# Patient Record
Sex: MALE | Race: WHITE | NOT HISPANIC OR LATINO | Employment: FULL TIME | URBAN - METROPOLITAN AREA
[De-identification: names, ages, dates, MRNs, and addresses within clinical notes are randomized per-mention and may not be internally consistent; named-entity substitution may affect disease eponyms.]

---

## 2021-08-03 ENCOUNTER — OFFICE VISIT (OUTPATIENT)
Dept: FAMILY MEDICINE | Facility: CLINIC | Age: 36
End: 2021-08-03

## 2021-08-03 VITALS
TEMPERATURE: 97.9 F | BODY MASS INDEX: 26.29 KG/M2 | OXYGEN SATURATION: 98 % | DIASTOLIC BLOOD PRESSURE: 70 MMHG | WEIGHT: 187.8 LBS | SYSTOLIC BLOOD PRESSURE: 110 MMHG | HEART RATE: 74 BPM | HEIGHT: 71 IN

## 2021-08-03 DIAGNOSIS — F41.9 ANXIETY: ICD-10-CM

## 2021-08-03 DIAGNOSIS — F32.89 OTHER DEPRESSION: Primary | ICD-10-CM

## 2021-08-03 PROBLEM — Z71.89 ACP (ADVANCE CARE PLANNING): Status: ACTIVE | Noted: 2021-08-03

## 2021-08-03 PROBLEM — Z76.89 HEALTH CARE HOME: Status: ACTIVE | Noted: 2021-08-03

## 2021-08-03 PROCEDURE — 99203 OFFICE O/P NEW LOW 30 MIN: CPT | Performed by: FAMILY MEDICINE

## 2021-08-03 RX ORDER — CITALOPRAM HYDROBROMIDE 20 MG/1
20 TABLET ORAL DAILY
Qty: 90 TABLET | Refills: 0 | Status: SHIPPED | OUTPATIENT
Start: 2021-08-03 | End: 2022-05-02

## 2021-08-03 ASSESSMENT — ANXIETY QUESTIONNAIRES
3. WORRYING TOO MUCH ABOUT DIFFERENT THINGS: NEARLY EVERY DAY
GAD7 TOTAL SCORE: 12
6. BECOMING EASILY ANNOYED OR IRRITABLE: SEVERAL DAYS
1. FEELING NERVOUS, ANXIOUS, OR ON EDGE: NEARLY EVERY DAY
2. NOT BEING ABLE TO STOP OR CONTROL WORRYING: NEARLY EVERY DAY
5. BEING SO RESTLESS THAT IT IS HARD TO SIT STILL: SEVERAL DAYS
7. FEELING AFRAID AS IF SOMETHING AWFUL MIGHT HAPPEN: SEVERAL DAYS

## 2021-08-03 ASSESSMENT — PATIENT HEALTH QUESTIONNAIRE - PHQ9
5. POOR APPETITE OR OVEREATING: NOT AT ALL
SUM OF ALL RESPONSES TO PHQ QUESTIONS 1-9: 7

## 2021-08-03 ASSESSMENT — MIFFLIN-ST. JEOR: SCORE: 1813.99

## 2021-08-03 NOTE — PROGRESS NOTES
Assessment & Plan   Problem List Items Addressed This Visit     None      Visit Diagnoses     Other depression    -  Primary    Relevant Medications    citalopram (CELEXA) 20 MG tablet    Anxiety        Relevant Medications    citalopram (CELEXA) 20 MG tablet         1. Other depression  We discussed risks including fda warning and bipolar risks. Discussed dosing, start 20 mg/day. He has good family support. See me back for a recheck In 2 weeks.  He was also interested in therapy. [facility].    2. Anxiety               FUTURE APPOINTMENTS:       - Follow-up visit in 2 weeks for recheck.    No follow-ups on file.    Ana Maria Daigle MD  [facility]    Subjective     Nursing Notes:   Kourtney Chan CMA  8/3/2021 10:07 AM  Addendum  Chief Complaint   Patient presents with     [facility]     Anxiety     2017 stopped taking medication     Pt wants RX for Celexa    Pre-Visit Screening:  Immunizations:declined TD  Colonoscopy:NA  Mammogram:Na  Asthma Action Test/Plan:Na  PHQ9:today  GAD7:today  Questioned patient about current smoking habits Pt.never  OK to leave a detailed message on voice mail for today's visit yes, phone # [phone]                Danis Lugo is a 36 year old male who presents to clinic today for the following health issues   HPI     Here for medication for depression and anxiety. Father in law suggested celexa. In 2017 was --on something but can't remember. Was suicidal at that time. Stopped it. Was on 150 mg of this. They thought he was bipolar. Neg family history bipolar. No idea why. He said he is not bipolar.   Just has some stress and anxiety.   Father in law then here on the phone. He wants him to try celexa because this has worked well for him and his wife.  He thinks he has been on sertraline. He doesn't want to see a counselor.  otc just using a protein supplement, no other supplements no alcohol,no drug use. Feels he needs to exercise  "more.    Review of Systems   Constitutional, HEENT, cardiovascular, pulmonary, gi and gu systems are negative, except as otherwise noted.      Objective    /70 (BP Location: Left arm, Patient Position: Sitting, Cuff Size: Adult Large)   Pulse 74   Temp 97.9  F (36.6  C) (Oral)   Ht 1.803 m (5' 11\")   Wt 85.2 kg (187 lb 12.8 oz)   SpO2 98%   BMI 26.19 kg/m    Body mass index is 26.19 kg/m .  Physical Exam             "

## 2021-08-03 NOTE — LETTER
[facility]  [address]  [address]  [facility] [address]  [phone]      August 3, 2021      Danis Lugo  [id number] [address]   [place] [address]      EMERGENCY CARE PLAN  Presenting Problem Treatment Plan   Questions or concerns during clinic hours I will call the clinic directly:    [facility]  [address], [address]  [place] [address]  [phone]   Questions or concerns outside clinic hours  I will call the 24 hour line at [phone]   Patient needs to schedule an appointment  I will call the  scheduling line at [phone]   Same day treatment   I will call the clinic first, then  urgent care and/or  express care if needed   Clinic Care Coordinators Jessica Mason RN:  [phone]  [facility] Clinical Support Staff: [phone]    Crisis Services:  Behavioral or Mental Health P ([facility])   [phone]   Emergency treatment--Immediately CALL [phone]

## 2021-08-03 NOTE — PATIENT INSTRUCTIONS
Assessment & Plan   Problem List Items Addressed This Visit     None      Visit Diagnoses     Other depression    -  Primary    Relevant Medications    citalopram (CELEXA) 20 MG tablet    Anxiety        Relevant Medications    citalopram (CELEXA) 20 MG tablet         1. Other depression  We discussed risks including fda warning and bipolar risks. Discussed dosing, start 20 mg/day. He has good family support. See me back for a recheck In 2 weeks.    2. Anxiety               FUTURE APPOINTMENTS:       - Follow-up visit in 2 weeks for recheck.    No follow-ups on file.    Ana Maria Daigle MD  [facility]

## 2021-08-03 NOTE — NURSING NOTE
Chief Complaint   Patient presents with     Establish Care     Anxiety     2017 stopped taking medication     Pt wants RX for Celexa    Pre-Visit Screening:  Immunizations:declined TD  Colonoscopy:NA  Mammogram:Na  Asthma Action Test/Plan:Na  PHQ9:today  GAD7:today  Questioned patient about current smoking habits Pt.never  OK to leave a detailed message on voice mail for today's visit yes, phone # [phone]

## 2021-08-04 ENCOUNTER — TELEPHONE (OUTPATIENT)
Dept: FAMILY MEDICINE | Facility: CLINIC | Age: 36
End: 2021-08-04

## 2021-08-04 ASSESSMENT — ANXIETY QUESTIONNAIRES: GAD7 TOTAL SCORE: 12

## 2021-08-04 NOTE — TELEPHONE ENCOUNTER
Brandon called stating that he has taken citalopram two days in a row and feels more fatigued that usual. He asked if he can take it at night. The answer is yes. I informed him that his body will be adjusting to a new medication and it could take some time before his body adjusts to it. I advised to take in the morning for 1 week then take in the pm for 1 week and decide which he liked better.

## 2021-10-15 ENCOUNTER — OFFICE VISIT (OUTPATIENT)
Dept: FAMILY MEDICINE | Facility: CLINIC | Age: 36
End: 2021-10-15

## 2021-10-15 VITALS
WEIGHT: 186 LBS | BODY MASS INDEX: 26.04 KG/M2 | TEMPERATURE: 97.6 F | HEIGHT: 71 IN | HEART RATE: 80 BPM | OXYGEN SATURATION: 97 % | SYSTOLIC BLOOD PRESSURE: 118 MMHG | DIASTOLIC BLOOD PRESSURE: 74 MMHG

## 2021-10-15 DIAGNOSIS — F32.89 OTHER DEPRESSION: Primary | ICD-10-CM

## 2021-10-15 DIAGNOSIS — F41.9 ANXIETY: ICD-10-CM

## 2021-10-15 PROCEDURE — 99213 OFFICE O/P EST LOW 20 MIN: CPT | Performed by: FAMILY MEDICINE

## 2021-10-15 RX ORDER — CITALOPRAM HYDROBROMIDE 40 MG/1
40 TABLET ORAL DAILY
Qty: 90 TABLET | Refills: 1 | Status: SHIPPED | OUTPATIENT
Start: 2021-10-15 | End: 2022-05-02

## 2021-10-15 ASSESSMENT — PATIENT HEALTH QUESTIONNAIRE - PHQ9
SUM OF ALL RESPONSES TO PHQ QUESTIONS 1-9: 1
5. POOR APPETITE OR OVEREATING: NOT AT ALL

## 2021-10-15 ASSESSMENT — ANXIETY QUESTIONNAIRES
2. NOT BEING ABLE TO STOP OR CONTROL WORRYING: NOT AT ALL
IF YOU CHECKED OFF ANY PROBLEMS ON THIS QUESTIONNAIRE, HOW DIFFICULT HAVE THESE PROBLEMS MADE IT FOR YOU TO DO YOUR WORK, TAKE CARE OF THINGS AT HOME, OR GET ALONG WITH OTHER PEOPLE: SOMEWHAT DIFFICULT
6. BECOMING EASILY ANNOYED OR IRRITABLE: SEVERAL DAYS
1. FEELING NERVOUS, ANXIOUS, OR ON EDGE: NOT AT ALL
3. WORRYING TOO MUCH ABOUT DIFFERENT THINGS: SEVERAL DAYS
7. FEELING AFRAID AS IF SOMETHING AWFUL MIGHT HAPPEN: SEVERAL DAYS
5. BEING SO RESTLESS THAT IT IS HARD TO SIT STILL: NOT AT ALL
GAD7 TOTAL SCORE: 3

## 2021-10-15 ASSESSMENT — MIFFLIN-ST. JEOR: SCORE: 1805.82

## 2021-10-15 NOTE — NURSING NOTE
Chief Complaint   Patient presents with     Recheck Medication     refill medications, would like to discuss upping dosage to 40MG      Pre-visit Screening:  Immunizations:  up to date  Colonoscopy:  NA  Mammogram: NA  Asthma Action Test/Plan:  NA  PHQ9:  NA  GAD7:  NA  Questioned patient about current smoking habits Pt. has never smoked.  Ok to leave detailed message on voice mail for today's visit only Yes, phone # [phone]

## 2021-10-15 NOTE — PROGRESS NOTES
"Assessment & Plan   Problem List Items Addressed This Visit        Behavioral    Other depression - Primary    Relevant Medications    citalopram (CELEXA) 40 MG tablet       Other    Anxiety    Relevant Medications    citalopram (CELEXA) 40 MG tablet         1. Other depression  He is doing well on the medication. Increased dose. FDA warning discussed.  - citalopram (CELEXA) 40 MG tablet; Take 1 tablet (40 mg) by mouth daily  Dispense: 90 tablet; Refill: 1    2. Anxiety    - citalopram (CELEXA) 40 MG tablet; Take 1 tablet (40 mg) by mouth daily  Dispense: 90 tablet; Refill: 1           BMI:   Estimated body mass index is 25.94 kg/m  as calculated from the following:    Height as of this encounter: 1.803 m (5' 11\").    Weight as of this encounter: 84.4 kg (186 lb).         FUTURE APPOINTMENTS:       - Follow-up visit in 3-6 months    No follow-ups on file.    Ana Maria Daigle MD  [facility]    Subjective     Nursing Notes:   Alice Dong CMA  10/15/2021  2:03 PM  Signed  Chief Complaint   Patient presents with     Recheck Medication     refill medications, would like to discuss upping dosage to 40MG      Pre-visit Screening:  Immunizations:  up to date  Colonoscopy:  NA  Mammogram: NA  Asthma Action Test/Plan:  NA  PHQ9:  NA  GAD7:  NA  Questioned patient about current smoking habits Pt. has never smoked.  Ok to leave detailed message on voice mail for today's visit only Yes, phone # [phone]           Danis Lugo is a 36 year old male who presents to clinic today for the following health issues   HPI     Here to followup on his celexa for anxiety/depression. Is doing well on the medications. Would like to increase the dose. Family member is on a higher dose and he feels this  Would help him more. No problems with side effects.          Review of Systems   Constitutional, HEENT, cardiovascular, pulmonary, gi and gu systems are negative, except as otherwise noted.      Objective    BP " "118/74 (BP Location: Right arm, Patient Position: Sitting, Cuff Size: Adult Large)   Pulse 80   Temp 97.6  F (36.4  C) (Temporal)   Ht 1.803 m (5' 11\")   Wt 84.4 kg (186 lb)   SpO2 97%   BMI 25.94 kg/m    Body mass index is 25.94 kg/m .  Physical Exam   GENERAL: healthy, alert and no distress  MS: no gross musculoskeletal defects noted, no edema  NEURO: Normal strength and tone, mentation intact and speech normal  PSYCH: mentation appears normal, affect normal/bright          "

## 2021-10-16 ASSESSMENT — ANXIETY QUESTIONNAIRES: GAD7 TOTAL SCORE: 3

## 2021-12-23 ENCOUNTER — TELEPHONE (OUTPATIENT)
Dept: FAMILY MEDICINE | Facility: CLINIC | Age: 36
End: 2021-12-23

## 2021-12-23 NOTE — TELEPHONE ENCOUNTER
Pt called asking if he can safely take creatinine for workouts while on his citalopram. Spoke with LES. No known interactions. She does not encourage the use of creatinine. Notified pt.

## 2021-12-30 ENCOUNTER — TELEPHONE (OUTPATIENT)
Dept: FAMILY MEDICINE | Facility: CLINIC | Age: 36
End: 2021-12-30

## 2021-12-30 NOTE — TELEPHONE ENCOUNTER
Pt called asking if there was a better alternative to citalopram. Tried to call pt back to advise him to schedule appt. Voicemail box full.

## 2022-02-21 ENCOUNTER — ALLIED HEALTH/NURSE VISIT (OUTPATIENT)
Dept: FAMILY MEDICINE | Facility: CLINIC | Age: 37
End: 2022-02-21

## 2022-02-21 DIAGNOSIS — Z23 NEED FOR VACCINATION: Primary | ICD-10-CM

## 2022-02-21 PROCEDURE — 90715 TDAP VACCINE 7 YRS/> IM: CPT | Performed by: FAMILY MEDICINE

## 2022-02-21 PROCEDURE — 90471 IMMUNIZATION ADMIN: CPT | Performed by: FAMILY MEDICINE

## 2022-04-29 NOTE — PROGRESS NOTES
"Assessment & Plan   Problem List Items Addressed This Visit        Behavioral    Other depression    Relevant Medications    citalopram (CELEXA) 20 MG tablet    buPROPion (WELLBUTRIN XL) 150 MG 24 hr tablet       Other    Anxiety    Relevant Medications    citalopram (CELEXA) 20 MG tablet    buPROPion (WELLBUTRIN XL) 150 MG 24 hr tablet      Other Visit Diagnoses     COVID-19 virus infection    -  Primary         1. Other depression  Decrease dose of citalopram to 20 mg/day for 1-2 weeks, then start wellbutrin. FDA warning discussed.  - citalopram (CELEXA) 20 MG tablet; Take 1 tablet (20 mg) by mouth daily  Dispense: 15 tablet; Refill: 0  - buPROPion (WELLBUTRIN XL) 150 MG 24 hr tablet; Take 1 tablet (150 mg) by mouth every morning  Dispense: 90 tablet; Refill: 1    2. Anxiety    - citalopram (CELEXA) 20 MG tablet; Take 1 tablet (20 mg) by mouth daily  Dispense: 15 tablet; Refill: 0  - buPROPion (WELLBUTRIN XL) 150 MG 24 hr tablet; Take 1 tablet (150 mg) by mouth every morning  Dispense: 90 tablet; Refill: 1    3. COVID-19 virus infection  Note written for his work per information that he was able to give me.           BMI:   Estimated body mass index is 27.95 kg/m  as calculated from the following:    Height as of this encounter: 1.803 m (5' 11\").    Weight as of this encounter: 90.9 kg (200 lb 6.4 oz).         FUTURE APPOINTMENTS:       - Follow-up visit in 6 months, call me in 2-3 weeks to let me know how the medication is working for him.    No follow-ups on file.    Ana Maria Daigle MD  [facility]    Subjective     Nursing Notes:   Alice Dong CMA  5/2/2022 10:00 AM  Signed  Chief Complaint   Patient presents with     Letter for School/Work     Was off work due to COVID, tested POS for COVID in January and missed work for 21 days, needs note for work explaining he had covid      Recheck Medication     Refill citalopram      Pre-visit Screening:  Immunizations:  up to date  Colonoscopy:  " "NA  Mammogram: NA  Asthma Action Test/Plan:  NA  PHQ9:  Done today  GAD7:  DOne today  Questioned patient about current smoking habits Pt. has never smoked.  Ok to leave detailed message on voice mail for today's visit only Yes, phone # [phone]           Danis Lugo is a 36 year old male who presents to clinic today for the following health issues   HPI     Here for followup on his anxiety, he is now on celexa 40 mg/day but still find himself anxious more than he feels he should be. Is overthinking things. Sister is on wellbutrin.    Also had covid in January or February. Needs a doctors note saying that he is clear to go. Missed 21 days of pay. Had it for 5 days and then tested. Went to [email or web address] and got tested in the drive through. Tested twice positive.  Tested positive on January 29th.    No symptoms now. Not immunocompromised. No fevers. Otherwise feeling well.        Review of Systems   Constitutional, HEENT, cardiovascular, pulmonary, gi and gu systems are negative, except as otherwise noted.      Objective    /74 (BP Location: Right arm, Patient Position: Sitting, Cuff Size: Adult Large)   Pulse 84   Temp 97.6  F (36.4  C) (Temporal)   Ht 1.803 m (5' 11\")   Wt 90.9 kg (200 lb 6.4 oz)   SpO2 96%   BMI 27.95 kg/m    Body mass index is 27.95 kg/m .  Physical Exam   GENERAL: healthy, alert and no distress  RESP: lungs clear to auscultation - no rales, rhonchi or wheezes  CV: regular rate and rhythm, normal S1 S2, no S3 or S4, no murmur, click or rub, no peripheral edema and peripheral pulses strong  MS: no gross musculoskeletal defects noted, no edema  NEURO: Normal strength and tone, mentation intact and speech normal  PSYCH: mentation appears normal, affect normal/bright          "

## 2022-04-30 DIAGNOSIS — F32.89 OTHER DEPRESSION: ICD-10-CM

## 2022-04-30 DIAGNOSIS — F41.9 ANXIETY: ICD-10-CM

## 2022-05-02 ENCOUNTER — OFFICE VISIT (OUTPATIENT)
Dept: FAMILY MEDICINE | Facility: CLINIC | Age: 37
End: 2022-05-02

## 2022-05-02 VITALS
OXYGEN SATURATION: 96 % | TEMPERATURE: 97.6 F | BODY MASS INDEX: 28.06 KG/M2 | HEART RATE: 84 BPM | SYSTOLIC BLOOD PRESSURE: 112 MMHG | WEIGHT: 200.4 LBS | HEIGHT: 71 IN | DIASTOLIC BLOOD PRESSURE: 74 MMHG

## 2022-05-02 DIAGNOSIS — F41.9 ANXIETY: ICD-10-CM

## 2022-05-02 DIAGNOSIS — F32.89 OTHER DEPRESSION: ICD-10-CM

## 2022-05-02 DIAGNOSIS — U07.1 COVID-19 VIRUS INFECTION: Primary | ICD-10-CM

## 2022-05-02 PROCEDURE — 99214 OFFICE O/P EST MOD 30 MIN: CPT | Performed by: FAMILY MEDICINE

## 2022-05-02 RX ORDER — CITALOPRAM HYDROBROMIDE 40 MG/1
40 TABLET ORAL DAILY
Qty: 90 TABLET | Refills: 1 | Status: CANCELLED | OUTPATIENT
Start: 2022-05-02

## 2022-05-02 RX ORDER — BUPROPION HYDROCHLORIDE 150 MG/1
150 TABLET ORAL EVERY MORNING
Qty: 90 TABLET | Refills: 1 | Status: SHIPPED | OUTPATIENT
Start: 2022-05-02 | End: 2022-06-01

## 2022-05-02 RX ORDER — CITALOPRAM HYDROBROMIDE 40 MG/1
TABLET ORAL
Qty: 90 TABLET | Refills: 0 | COMMUNITY
Start: 2022-05-02

## 2022-05-02 RX ORDER — CITALOPRAM HYDROBROMIDE 20 MG/1
20 TABLET ORAL DAILY
Qty: 15 TABLET | Refills: 0 | Status: SHIPPED | OUTPATIENT
Start: 2022-05-02 | End: 2022-06-01

## 2022-05-02 ASSESSMENT — PATIENT HEALTH QUESTIONNAIRE - PHQ9
5. POOR APPETITE OR OVEREATING: SEVERAL DAYS
SUM OF ALL RESPONSES TO PHQ QUESTIONS 1-9: 6

## 2022-05-02 ASSESSMENT — ANXIETY QUESTIONNAIRES
7. FEELING AFRAID AS IF SOMETHING AWFUL MIGHT HAPPEN: SEVERAL DAYS
6. BECOMING EASILY ANNOYED OR IRRITABLE: MORE THAN HALF THE DAYS
2. NOT BEING ABLE TO STOP OR CONTROL WORRYING: MORE THAN HALF THE DAYS
1. FEELING NERVOUS, ANXIOUS, OR ON EDGE: SEVERAL DAYS
5. BEING SO RESTLESS THAT IT IS HARD TO SIT STILL: NOT AT ALL
IF YOU CHECKED OFF ANY PROBLEMS ON THIS QUESTIONNAIRE, HOW DIFFICULT HAVE THESE PROBLEMS MADE IT FOR YOU TO DO YOUR WORK, TAKE CARE OF THINGS AT HOME, OR GET ALONG WITH OTHER PEOPLE: VERY DIFFICULT
GAD7 TOTAL SCORE: 9
3. WORRYING TOO MUCH ABOUT DIFFERENT THINGS: MORE THAN HALF THE DAYS

## 2022-05-02 NOTE — LETTER
[facility]  [address], [address]  [place]  [address]    May 2, 2022        Danis Lugo  [address] [address]   [place] [address]              Re: Danis Francisgodwin    I saw Danis in clinic today. He reports that he tested positive for covid on January 29th.    He no longer has symptoms and is feeling well. He reports that he missed 21 days of work.         If you have any further questions or problems, please contact our office at [phone].          Ana Maria Daigle MD

## 2022-05-02 NOTE — TELEPHONE ENCOUNTER
Danis Lugo is requesting a refill of:    Refused Prescriptions:                       Disp   Refills    citalopram (CELEXA) 40 MG tablet [Pharmacy*90 tab*0        Sig: TAKE ONE TABLET BY MOUTH ONE TIME DAILY  Refused By: PILO WEAVER  Reason for Refusal: Patient needs appointment    Pt last seen 10/15/21 advised to return in 6 months. Pt due for OV

## 2022-05-02 NOTE — NURSING NOTE
Chief Complaint   Patient presents with     Letter for School/Work     Was off work due to COVID, tested POS for COVID in January and missed work for 21 days, needs note for work explaining he had covid      Recheck Medication     Refill citalopram      Pre-visit Screening:  Immunizations:  up to date  Colonoscopy:  NA  Mammogram: NA  Asthma Action Test/Plan:  NA  PHQ9:  Done today  GAD7:  DOne today  Questioned patient about current smoking habits Pt. has never smoked.  Ok to leave detailed message on voice mail for today's visit only Yes, phone # [phone]

## 2022-05-03 ASSESSMENT — ANXIETY QUESTIONNAIRES: GAD7 TOTAL SCORE: 9

## 2022-06-01 ENCOUNTER — TELEPHONE (OUTPATIENT)
Dept: FAMILY MEDICINE | Facility: CLINIC | Age: 37
End: 2022-06-01

## 2022-06-01 DIAGNOSIS — F41.9 ANXIETY: ICD-10-CM

## 2022-06-01 DIAGNOSIS — F32.89 OTHER DEPRESSION: ICD-10-CM

## 2022-06-01 RX ORDER — CITALOPRAM HYDROBROMIDE 20 MG/1
20 TABLET ORAL DAILY
Qty: 30 TABLET | Refills: 0 | Status: SHIPPED | OUTPATIENT
Start: 2022-06-01 | End: 2022-07-06

## 2022-06-01 NOTE — TELEPHONE ENCOUNTER
Pt is ok with this plan. Would like shot supply of Citalopram 20MG. He will scheduled f/u to discuss other options.    Danis Lugo is requesting a refill of:    Pending Prescriptions:                       Disp   Refills    citalopram (CELEXA) 20 MG tablet          30 tab*0            Sig: Take 1 tablet (20 mg) by mouth daily

## 2022-06-01 NOTE — TELEPHONE ENCOUNTER
Call him--have him stop the wellbutrin, continue on the lower dose of celexa that we discussed and see how he feels. Then recheck with me, he can do a virtual apt. We can change to a different selective serotonin reuptake inhibitor.

## 2022-06-01 NOTE — TELEPHONE ENCOUNTER
Brandon called to follow up since his appt on 5/2/2022. He started bupropion and was previously taking citalopram.  He states that since starting bupropion he has been feeling dizzy and more irritable.   Next steps? Please advise, thanks.    Danis's # [phone]

## 2022-07-03 DIAGNOSIS — F41.9 ANXIETY: ICD-10-CM

## 2022-07-03 DIAGNOSIS — F32.89 OTHER DEPRESSION: ICD-10-CM

## 2022-07-03 RX ORDER — CITALOPRAM HYDROBROMIDE 20 MG/1
TABLET ORAL
Qty: 30 TABLET | Refills: 0 | COMMUNITY
Start: 2022-07-03

## 2022-07-04 NOTE — TELEPHONE ENCOUNTER
Received incoming refill request for  Pending Prescriptions:                       Disp   Refills    citalopram (CELEXA) 20 MG tablet [Pharmac*30 tab*0            Sig: TAKE 1 TABLET(20 MG) BY MOUTH DAILY    Patient last had a refill of this medication on 6/1/22 and the patient is due back to be seen to discuss other options which he was informed of last month.

## 2022-07-06 ENCOUNTER — TELEPHONE (OUTPATIENT)
Dept: FAMILY MEDICINE | Facility: CLINIC | Age: 37
End: 2022-07-06

## 2022-07-06 DIAGNOSIS — F41.9 ANXIETY: ICD-10-CM

## 2022-07-06 DIAGNOSIS — F32.89 OTHER DEPRESSION: ICD-10-CM

## 2022-07-06 RX ORDER — CITALOPRAM HYDROBROMIDE 20 MG/1
20 TABLET ORAL DAILY
Qty: 14 TABLET | Refills: 0 | COMMUNITY
Start: 2022-07-06 | End: 2022-08-01

## 2022-07-06 NOTE — TELEPHONE ENCOUNTER
Danis C Brittney called the clinic support line with the following:    Is needing a refill of his medication.  Advised that he needs OV for refills. Called in #[phone]    Danis Lugo is requesting a refill of:    Signed Prescriptions:                        Disp   Refills    citalopram (CELEXA) 20 MG tablet           14 tab*0        Sig: Take 1 tablet (20 mg) by mouth daily  Authorizing Provider: KARLA ALFREDO  Ordering User: GINETTE CHAMBERLAIN    NOT FURTHER REFILLS UNTIL SEEN. He will be calling to make an OV

## 2022-07-22 DIAGNOSIS — F32.89 OTHER DEPRESSION: ICD-10-CM

## 2022-07-22 DIAGNOSIS — F41.9 ANXIETY: ICD-10-CM

## 2022-07-22 RX ORDER — CITALOPRAM HYDROBROMIDE 20 MG/1
TABLET ORAL
Qty: 14 TABLET | Refills: 0 | COMMUNITY
Start: 2022-07-22

## 2022-07-22 NOTE — TELEPHONE ENCOUNTER
Danis Lugo is requesting a refill of:    Refused Prescriptions:                       Disp   Refills    citalopram (CELEXA) 20 MG tablet [Pharmacy*14 tab*0        Sig: TAKE 1 TABLET BY MOUTH EVERY DAY- MUST SEE DOCTOR  Refused By: PILO WEAVER  Reason for Refusal: Patient needs appointment    Pt due for OV, extension was already given

## 2022-07-27 ENCOUNTER — TELEPHONE (OUTPATIENT)
Dept: FAMILY MEDICINE | Facility: CLINIC | Age: 37
End: 2022-07-27

## 2022-07-27 DIAGNOSIS — F32.89 OTHER DEPRESSION: ICD-10-CM

## 2022-07-27 DIAGNOSIS — F41.9 ANXIETY: ICD-10-CM

## 2022-07-27 NOTE — TELEPHONE ENCOUNTER
Pt called stating he has questions on his medications. Tried to call pt. Voicemail box full. PT DUE FOR OV

## 2022-08-01 RX ORDER — CITALOPRAM HYDROBROMIDE 20 MG/1
20 TABLET ORAL DAILY
Qty: 7 TABLET | Refills: 0 | COMMUNITY
Start: 2022-08-01 | End: 2022-08-08

## 2022-08-01 NOTE — TELEPHONE ENCOUNTER
Pt called back. Scheduled him for 08/08/22. Called in 7 day supply of citalopram 20MG to [facility].

## 2022-08-05 DIAGNOSIS — F32.89 OTHER DEPRESSION: ICD-10-CM

## 2022-08-05 DIAGNOSIS — F41.9 ANXIETY: ICD-10-CM

## 2022-08-05 RX ORDER — CITALOPRAM HYDROBROMIDE 20 MG/1
TABLET ORAL
Qty: 7 TABLET | Refills: 0 | COMMUNITY
Start: 2022-08-05

## 2022-08-05 NOTE — TELEPHONE ENCOUNTER
Danis Lugo is requesting a refill of:    Refused Prescriptions:                       Disp   Refills    citalopram (CELEXA) 20 MG tablet [Pharmacy*7 tabl*0        Sig: TAKE 1 TABLET BY MOUTH EVERY DAY  Refused By: PILO WEAVER  Reason for Refusal: Patient needs appointment    Pt due for OV

## 2022-08-08 ENCOUNTER — OFFICE VISIT (OUTPATIENT)
Dept: FAMILY MEDICINE | Facility: CLINIC | Age: 37
End: 2022-08-08

## 2022-08-08 VITALS
HEIGHT: 71 IN | HEART RATE: 69 BPM | OXYGEN SATURATION: 97 % | BODY MASS INDEX: 29.54 KG/M2 | WEIGHT: 211 LBS | SYSTOLIC BLOOD PRESSURE: 106 MMHG | DIASTOLIC BLOOD PRESSURE: 74 MMHG | TEMPERATURE: 98.2 F

## 2022-08-08 DIAGNOSIS — F32.89 OTHER DEPRESSION: Primary | ICD-10-CM

## 2022-08-08 PROCEDURE — 99214 OFFICE O/P EST MOD 30 MIN: CPT | Performed by: FAMILY MEDICINE

## 2022-08-08 ASSESSMENT — PATIENT HEALTH QUESTIONNAIRE - PHQ9: SUM OF ALL RESPONSES TO PHQ QUESTIONS 1-9: 20

## 2022-08-08 NOTE — PROGRESS NOTES
"Assessment & Plan   Problem List Items Addressed This Visit        Behavioral    Other depression - Primary    Relevant Medications    sertraline (ZOLOFT) 50 MG tablet    Other Relevant Orders    Other Specialty Referral           1. Other depression  I will change his medications, stop celexa, try sertraline, discussed side effects including FDA warning. See me back for a recheck in 2-3 weeks.  - sertraline (ZOLOFT) 50 MG tablet; Take 1 tablet (50 mg) by mouth daily  Dispense: 90 tablet; Refill: 0  - Other Specialty Referral; Future         BMI:   Estimated body mass index is 29.43 kg/m  as calculated from the following:    Height as of this encounter: 1.803 m (5' 11\").    Weight as of this encounter: 95.7 kg (211 lb).         FUTURE APPOINTMENTS:       - Follow-up visit in 2-3 weeks.    No follow-ups on file.    Ana Maria Daigle MD  [facility]    Subjective     There are no exam notes on file for this visit.     Danis Lugo is a 37 year old male who presents to clinic today for the following health issues  HPI     Here to followup on celexa, depression. This isn't working well for him. This was working well for him initially but now it's not really working well. Was initially on celexa then stopped it and started wellbutrin. This just didn't work.  Wife is on sertraline--this seems to be working well for her.   Went to [facility]. Did therapy.  Was told that he doesn't have bipolar di.    His symptoms include:  \"feel sad all the time, zero patience, negative every day, always worried about what people think, miserable going to work and thinking about work at home, hard to live in the present, too hard on myself\"          Review of Systems   Constitutional, HEENT, cardiovascular, pulmonary, gi and gu systems are negative, except as otherwise noted.      Objective    /74 (BP Location: Right arm, Patient Position: Sitting, Cuff Size: Adult Large)   Pulse 69   Temp 98.2 " " F (36.8  C) (Temporal)   Ht 1.803 m (5' 11\")   Wt 95.7 kg (211 lb)   SpO2 97%   BMI 29.43 kg/m    Body mass index is 29.43 kg/m .  Physical Exam   GENERAL: healthy, alert and no distress  MS: no gross musculoskeletal defects noted, no edema  NEURO: Normal strength and tone, mentation intact and speech normal  PSYCH: mentation appears normal, affect normal/bright          "

## 2022-11-30 DIAGNOSIS — F32.89 OTHER DEPRESSION: ICD-10-CM

## 2022-12-05 DIAGNOSIS — F32.89 OTHER DEPRESSION: ICD-10-CM

## 2022-12-05 NOTE — TELEPHONE ENCOUNTER
Received incoming refill request for  Pending Prescriptions:                       Disp   Refills    sertraline (ZOLOFT) 50 MG tablet [Pharmac*90 tab*0            Sig: TAKE 1 TABLET(50 MG) BY MOUTH DAILY    Patient is due for a follow up visit so this is being denied.

## 2022-12-06 NOTE — TELEPHONE ENCOUNTER
Danis Lugo is requesting a refill of:    Refused Prescriptions:                       Disp   Refills    sertraline (ZOLOFT) 50 MG tablet [Pharmacy*90 tab*0        Sig: TAKE 1 TABLET(50 MG) BY MOUTH DAILY  Refused By: PILO WEAVER  Reason for Refusal: Patient needs appointment    Pt last seen 08/08/22 advised to return in 2-3 weeks, pt due for OV

## 2022-12-12 ENCOUNTER — TELEPHONE (OUTPATIENT)
Dept: FAMILY MEDICINE | Facility: CLINIC | Age: 37
End: 2022-12-12

## 2022-12-12 DIAGNOSIS — F32.89 OTHER DEPRESSION: ICD-10-CM

## 2022-12-12 NOTE — TELEPHONE ENCOUNTER
Pt Call the CS line -requesting an extension on his med sertraline 50 mg - He has an appointment on January 9, 2023.     Thank you. Dr Daigle

## 2023-01-09 ENCOUNTER — OFFICE VISIT (OUTPATIENT)
Dept: FAMILY MEDICINE | Facility: CLINIC | Age: 38
End: 2023-01-09

## 2023-01-09 ENCOUNTER — TELEPHONE (OUTPATIENT)
Dept: FAMILY MEDICINE | Facility: CLINIC | Age: 38
End: 2023-01-09

## 2023-01-09 VITALS
HEIGHT: 71 IN | TEMPERATURE: 98.2 F | BODY MASS INDEX: 30.13 KG/M2 | OXYGEN SATURATION: 97 % | DIASTOLIC BLOOD PRESSURE: 74 MMHG | WEIGHT: 215.2 LBS | HEART RATE: 78 BPM | SYSTOLIC BLOOD PRESSURE: 108 MMHG

## 2023-01-09 DIAGNOSIS — F32.89 OTHER DEPRESSION: ICD-10-CM

## 2023-01-09 DIAGNOSIS — F41.9 ANXIETY: Primary | ICD-10-CM

## 2023-01-09 PROCEDURE — 99214 OFFICE O/P EST MOD 30 MIN: CPT | Performed by: FAMILY MEDICINE

## 2023-01-09 RX ORDER — SERTRALINE HYDROCHLORIDE 100 MG/1
100 TABLET, FILM COATED ORAL DAILY
Qty: 90 TABLET | Refills: 1 | Status: SHIPPED | OUTPATIENT
Start: 2023-01-09 | End: 2023-01-10

## 2023-01-09 ASSESSMENT — ANXIETY QUESTIONNAIRES
6. BECOMING EASILY ANNOYED OR IRRITABLE: MORE THAN HALF THE DAYS
IF YOU CHECKED OFF ANY PROBLEMS ON THIS QUESTIONNAIRE, HOW DIFFICULT HAVE THESE PROBLEMS MADE IT FOR YOU TO DO YOUR WORK, TAKE CARE OF THINGS AT HOME, OR GET ALONG WITH OTHER PEOPLE: VERY DIFFICULT
1. FEELING NERVOUS, ANXIOUS, OR ON EDGE: MORE THAN HALF THE DAYS
2. NOT BEING ABLE TO STOP OR CONTROL WORRYING: NEARLY EVERY DAY
3. WORRYING TOO MUCH ABOUT DIFFERENT THINGS: NEARLY EVERY DAY
GAD7 TOTAL SCORE: 13
7. FEELING AFRAID AS IF SOMETHING AWFUL MIGHT HAPPEN: MORE THAN HALF THE DAYS
5. BEING SO RESTLESS THAT IT IS HARD TO SIT STILL: NOT AT ALL
GAD7 TOTAL SCORE: 13

## 2023-01-09 ASSESSMENT — PATIENT HEALTH QUESTIONNAIRE - PHQ9
5. POOR APPETITE OR OVEREATING: SEVERAL DAYS
SUM OF ALL RESPONSES TO PHQ QUESTIONS 1-9: 9

## 2023-01-09 NOTE — TELEPHONE ENCOUNTER
Patient called into the CS line asking for his medication to be changed from 100 mg to 75 mg as he feels this would be a better transition from taking the 50 mg he was currently on. Routing to Dr. Daigle for approval.

## 2023-01-09 NOTE — NURSING NOTE
Chief Complaint   Patient presents with     Recheck Medication     Recheck on sertraline, feels he would like to go up on the dose      Pre-visit Screening:  Immunizations:  up to date  Colonoscopy:  NA  Mammogram: NA  Asthma Action Test/Plan:  NA  PHQ9:  Done today  GAD7:  Done today  Questioned patient about current smoking habits Pt. has never smoked.  Ok to leave detailed message on voice mail for today's visit only Yes, phone # [phone]

## 2023-01-09 NOTE — PROGRESS NOTES
"Assessment & Plan   Problem List Items Addressed This Visit        Behavioral    Other depression    Relevant Medications    sertraline (ZOLOFT) 100 MG tablet       Other    Anxiety - Primary    Relevant Medications    sertraline (ZOLOFT) 100 MG tablet      1. Anxiety  He is doing better but would like an increased dose of sertraline. Increase to 100 mg/day, side effects including FDA warning discussed.  - sertraline (ZOLOFT) 100 MG tablet; Take 1 tablet (100 mg) by mouth daily  Dispense: 90 tablet; Refill: 1    2. Other depression  See above.  - sertraline (ZOLOFT) 100 MG tablet; Take 1 tablet (100 mg) by mouth daily  Dispense: 90 tablet; Refill: 1           BMI:   Estimated body mass index is 30.01 kg/m  as calculated from the following:    Height as of this encounter: 1.803 m (5' 11\").    Weight as of this encounter: 97.6 kg (215 lb 3.2 oz).         FUTURE APPOINTMENTS:       - Follow-up visit in  6months.    No follow-ups on file.    Ana Maria Daigle MD  [facility]    Subjective     Nursing Notes:   Alice Dong CMA  1/9/2023  2:52 PM  Signed  Chief Complaint   Patient presents with     Recheck Medication     Recheck on sertraline, feels he would like to go up on the dose      Pre-visit Screening:  Immunizations:  up to date  Colonoscopy:  NA  Mammogram: NA  Asthma Action Test/Plan:  NA  PHQ9:  Done today  GAD7:  Done today  Questioned patient about current smoking habits Pt. has never smoked.  Ok to leave detailed message on voice mail for today's visit only Yes, phone # [phone]           Danis Lugo is a 37 year old male who presents to clinic today for the following health issues     HPI     Here with his baby to followup on sertraline for depression and anxiety. He is doing better but would like to increase his dose. His wife is on the same medication and is on 100 mg/day. He wants to go up to this dose.        Review of Systems   Constitutional, HEENT, cardiovascular, " "pulmonary, gi and gu systems are negative, except as otherwise noted.      Objective    /74 (BP Location: Right arm, Patient Position: Sitting, Cuff Size: Adult Large)   Pulse 78   Temp 98.2  F (36.8  C) (Temporal)   Ht 1.803 m (5' 11\")   Wt 97.6 kg (215 lb 3.2 oz)   SpO2 97%   BMI 30.01 kg/m    Body mass index is 30.01 kg/m .  Physical Exam   GENERAL: healthy, alert and no distress  MS: no gross musculoskeletal defects noted, no edema  NEURO: Normal strength and tone, mentation intact and speech normal  PSYCH: mentation appears normal, affect normal/bright    No results found for any visits on 01/09/23.      "

## 2023-01-10 NOTE — TELEPHONE ENCOUNTER
Spoke with pt he is ok cutting these. Would like to do 50MG taking 1.5 tabs daily.    Danis Lugo is requesting a refill of:    Pending Prescriptions:                       Disp   Refills    sertraline (ZOLOFT) 50 MG tablet          135 ta*1            Sig: Take 1.5 tablets (75 mg) by mouth daily

## 2023-01-10 NOTE — TELEPHONE ENCOUNTER
Does he want 1 1/2 pills of the 50 mg? If he is ok to cut these, I can resend the smaller dose of 75 mg/day

## 2023-01-25 ENCOUNTER — TELEPHONE (OUTPATIENT)
Dept: FAMILY MEDICINE | Facility: CLINIC | Age: 38
End: 2023-01-25

## 2023-01-25 NOTE — TELEPHONE ENCOUNTER
Pt called stating for the last 3 days he has had congestion, body aches mainly in his back, and chest heaviness. He took a covid test today and it was negative. He was wondering if this is a side effect of upping his sertraline dosage. I advised him I do not believe this has to do with his medication. Advised him to rest, push fluids, take tylenol/ibuprofen, and use Mucinex for his congestion. Pt understood and had no further questions or concerns.

## 2023-05-10 ENCOUNTER — TELEPHONE (OUTPATIENT)
Dept: FAMILY MEDICINE | Facility: CLINIC | Age: 38
End: 2023-05-10

## 2023-05-10 NOTE — TELEPHONE ENCOUNTER
He could try going down to 50 mg/day, which is 1 pill until he comes in to be seen and we can see if that makes a difference.

## 2023-05-10 NOTE — TELEPHONE ENCOUNTER
Pt called to inform that he is having trouble concentrating and feels like it is taking longer to get things done. He wanted to know if this would be an effect of his medication (sertraline). He did increase his dose in Jan 2023 from 50 mg to 75 mg. He is not sure if that would be causing this.    I informed him that he is almost due for his 6 month medication check and could discuss this then. I offered to transfer him to the  to get scheduled but he'd prefer to call back another time.    Pt phone # [phone]

## 2023-07-19 DIAGNOSIS — F32.89 OTHER DEPRESSION: ICD-10-CM

## 2023-07-19 DIAGNOSIS — F41.9 ANXIETY: ICD-10-CM

## 2023-07-19 NOTE — TELEPHONE ENCOUNTER
Pt is requesting a refill of sertraline. I informed him that he is due for OV. He is switching jobs and moving to [place] in Sep. He needs to confirm that his insurance is active still before he schedules. I advised to establish care with new PCP once he moves. Although he is completely out of meds and leaving for a camping trip Friday.    Would you be willing to send in a two week supply until he is able to get in and speak with insurance?    Thanks, Mitra        Pending Prescriptions:                       Disp   Refills    sertraline (ZOLOFT) 50 MG tablet          21 tab*0            Sig: Take 1.5 tablets (75 mg) by mouth daily

## 2023-08-16 NOTE — TELEPHONE ENCOUNTER
Patient called into the CS line and will not have insurance until September 1st but is wondering if he can get one more extension until then-he will call for sure on September 1st when his new insurance begins so that he is seen before his move on 9/15-routing to Dr. Daigle for approval.        Pending Prescriptions:                       Disp   Refills    sertraline (ZOLOFT) 50 MG tablet          16 tab*0            Sig: Take 1.5 tablets (75 mg) by mouth daily    Signed Prescriptions:                        Disp   Refills    sertraline (ZOLOFT) 50 MG tablet           21 tab*0        Sig: Take 1.5 tablets (75 mg) by mouth daily  Authorizing Provider: KARLA DAIGLE

## 2023-10-17 DIAGNOSIS — F32.89 OTHER DEPRESSION: ICD-10-CM

## 2023-10-17 DIAGNOSIS — F41.9 ANXIETY: ICD-10-CM

## 2023-10-17 NOTE — TELEPHONE ENCOUNTER
Pt called back asking for 30 day until he can find new PCP. Can you review for KEP?    Danis Lugo is requesting a refill of:    Pending Prescriptions:                       Disp   Refills    sertraline (ZOLOFT) 50 MG tablet          45 tab*0            Sig: Take 1.5 tablets (75 mg) by mouth daily

## 2023-10-17 NOTE — TELEPHONE ENCOUNTER
He should be seen, he has called a couple of times about this medication and dose adjustments.he can do a video apt.

## 2023-10-17 NOTE — TELEPHONE ENCOUNTER
Pt has moved to [place], needs to find new PCP. Pt would like extension of medication.    Danis Lugo is requesting a refill of:    Pending Prescriptions:                       Disp   Refills    sertraline (ZOLOFT) 50 MG tablet          135 ta*0            Sig: Take 1.5 tablets (75 mg) by mouth daily

## 2023-11-13 DIAGNOSIS — F32.89 OTHER DEPRESSION: ICD-10-CM

## 2023-11-13 DIAGNOSIS — F41.9 ANXIETY: ICD-10-CM

## 2023-11-13 NOTE — TELEPHONE ENCOUNTER
Danis Lugo is requesting a refill of:    Refused Prescriptions:                       Disp   Refills    sertraline (ZOLOFT) 50 MG tablet [Pharmacy*                Sig: TAKE 1.5 TABLETS BY MOUTH DAILY  Refused By: GINETTE CHAMBERLAIN  Reason for Refusal: Patient needs appointment    Needs OV for refills

## 2023-12-20 NOTE — TELEPHONE ENCOUNTER
Patient was laid off from his job so he lost his health insurance which causes it to become more of a problem to find a new PCP. He is wondering if he can get one more extension of this to give him a little more time. Routing to Dr. Veras for approval.      Pending Prescriptions:                       Disp   Refills    sertraline (ZOLOFT) 50 MG tablet          45 tab*0            Sig: Take 1.5 tablets (75 mg) by mouth daily  Refused Prescriptions:                       Disp   Refills    sertraline (ZOLOFT) 50 MG tablet [Pharmacy*                Sig: TAKE 1.5 TABLETS BY MOUTH DAILY  Refused By: GINETTE CHAMBERLAIN  Reason for Refusal: Patient needs appointment

## 2023-12-28 NOTE — TELEPHONE ENCOUNTER
2 weeks sent. He hasn't been seen in a year. Will need to be seen somewhere if he has moved, will need to see a new provider.

## 2024-06-17 PROBLEM — Z76.89 HEALTH CARE HOME: Status: RESOLVED | Noted: 2021-08-03 | Resolved: 2024-06-17
